# Patient Record
Sex: MALE | Race: WHITE | NOT HISPANIC OR LATINO
[De-identification: names, ages, dates, MRNs, and addresses within clinical notes are randomized per-mention and may not be internally consistent; named-entity substitution may affect disease eponyms.]

---

## 2018-08-07 ENCOUNTER — TRANSCRIPTION ENCOUNTER (OUTPATIENT)
Age: 68
End: 2018-08-07

## 2018-08-07 ENCOUNTER — APPOINTMENT (OUTPATIENT)
Dept: ORTHOPEDIC SURGERY | Facility: CLINIC | Age: 68
End: 2018-08-07
Payer: MEDICARE

## 2018-08-07 VITALS
HEIGHT: 70 IN | DIASTOLIC BLOOD PRESSURE: 73 MMHG | HEART RATE: 61 BPM | BODY MASS INDEX: 28.77 KG/M2 | WEIGHT: 201 LBS | SYSTOLIC BLOOD PRESSURE: 126 MMHG

## 2018-08-07 DIAGNOSIS — Z86.39 PERSONAL HISTORY OF OTHER ENDOCRINE, NUTRITIONAL AND METABOLIC DISEASE: ICD-10-CM

## 2018-08-07 DIAGNOSIS — Z78.9 OTHER SPECIFIED HEALTH STATUS: ICD-10-CM

## 2018-08-07 DIAGNOSIS — Z82.61 FAMILY HISTORY OF ARTHRITIS: ICD-10-CM

## 2018-08-07 DIAGNOSIS — Z87.891 PERSONAL HISTORY OF NICOTINE DEPENDENCE: ICD-10-CM

## 2018-08-07 DIAGNOSIS — Z86.79 PERSONAL HISTORY OF OTHER DISEASES OF THE CIRCULATORY SYSTEM: ICD-10-CM

## 2018-08-07 DIAGNOSIS — M17.0 BILATERAL PRIMARY OSTEOARTHRITIS OF KNEE: ICD-10-CM

## 2018-08-07 PROCEDURE — 73562 X-RAY EXAM OF KNEE 3: CPT | Mod: 50

## 2018-08-07 PROCEDURE — 72170 X-RAY EXAM OF PELVIS: CPT | Mod: 59

## 2018-08-07 PROCEDURE — 99204 OFFICE O/P NEW MOD 45 MIN: CPT

## 2019-07-23 ENCOUNTER — TRANSCRIPTION ENCOUNTER (OUTPATIENT)
Age: 69
End: 2019-07-23

## 2019-08-06 ENCOUNTER — NON-APPOINTMENT (OUTPATIENT)
Age: 69
End: 2019-08-06

## 2019-08-06 ENCOUNTER — APPOINTMENT (OUTPATIENT)
Dept: ELECTROPHYSIOLOGY | Facility: CLINIC | Age: 69
End: 2019-08-06
Payer: MEDICARE

## 2019-08-06 VITALS
OXYGEN SATURATION: 97 % | SYSTOLIC BLOOD PRESSURE: 148 MMHG | DIASTOLIC BLOOD PRESSURE: 83 MMHG | HEIGHT: 70 IN | BODY MASS INDEX: 30.06 KG/M2 | HEART RATE: 63 BPM | WEIGHT: 210 LBS

## 2019-08-06 DIAGNOSIS — R00.2 PALPITATIONS: ICD-10-CM

## 2019-08-06 DIAGNOSIS — Z51.81 ENCOUNTER FOR THERAPEUTIC DRUG LVL MONITORING: ICD-10-CM

## 2019-08-06 DIAGNOSIS — E11.9 TYPE 2 DIABETES MELLITUS W/OUT COMPLICATIONS: ICD-10-CM

## 2019-08-06 DIAGNOSIS — I25.2 OLD MYOCARDIAL INFARCTION: ICD-10-CM

## 2019-08-06 DIAGNOSIS — Z79.899 ENCOUNTER FOR THERAPEUTIC DRUG LVL MONITORING: ICD-10-CM

## 2019-08-06 PROCEDURE — 99204 OFFICE O/P NEW MOD 45 MIN: CPT

## 2019-08-06 PROCEDURE — 93000 ELECTROCARDIOGRAM COMPLETE: CPT

## 2019-08-06 RX ORDER — INSULIN GLARGINE 100 [IU]/ML
100 INJECTION, SOLUTION SUBCUTANEOUS
Qty: 45 | Refills: 0 | Status: DISCONTINUED | COMMUNITY
Start: 2018-06-19

## 2019-08-06 RX ORDER — HYDROCODONE/ACETAMINOPHEN 5 MG-500MG
CAPSULE ORAL
Refills: 0 | Status: DISCONTINUED | COMMUNITY
End: 2019-08-06

## 2019-08-06 RX ORDER — ATORVASTATIN CALCIUM 40 MG/1
40 TABLET, FILM COATED ORAL
Qty: 14 | Refills: 0 | Status: ACTIVE | COMMUNITY
Start: 2019-05-14

## 2019-08-06 RX ORDER — DULAGLUTIDE 4.5 MG/.5ML
INJECTION, SOLUTION SUBCUTANEOUS
Refills: 0 | Status: DISCONTINUED | COMMUNITY
End: 2019-08-06

## 2019-08-06 RX ORDER — METOPROLOL SUCCINATE 50 MG/1
50 TABLET, EXTENDED RELEASE ORAL
Qty: 90 | Refills: 0 | Status: DISCONTINUED | COMMUNITY
Start: 2019-07-19

## 2019-08-06 RX ORDER — OLMESARTAN MEDOXOMIL 20 MG/1
20 TABLET, FILM COATED ORAL
Qty: 30 | Refills: 0 | Status: DISCONTINUED | COMMUNITY
Start: 2019-02-19

## 2019-08-06 RX ORDER — OMEPRAZOLE 40 MG/1
40 CAPSULE, DELAYED RELEASE ORAL
Qty: 90 | Refills: 0 | Status: ACTIVE | COMMUNITY
Start: 2019-07-31

## 2019-08-06 RX ORDER — BLOOD SUGAR DIAGNOSTIC
STRIP MISCELLANEOUS
Qty: 300 | Refills: 0 | Status: DISCONTINUED | COMMUNITY
Start: 2019-02-20

## 2019-08-06 RX ORDER — INSULIN LISPRO 100 [IU]/ML
100 INJECTION, SOLUTION INTRAVENOUS; SUBCUTANEOUS
Refills: 0 | Status: ACTIVE | COMMUNITY

## 2019-08-06 RX ORDER — SOTALOL HYDROCHLORIDE 80 MG/1
80 TABLET ORAL TWICE DAILY
Refills: 0 | Status: DISCONTINUED | COMMUNITY
End: 2019-08-06

## 2019-08-06 RX ORDER — FENOFIBRATE 150 MG/1
150 CAPSULE ORAL DAILY
Refills: 0 | Status: ACTIVE | COMMUNITY

## 2019-08-06 RX ORDER — ASPIRIN 81 MG/1
81 TABLET ORAL DAILY
Refills: 0 | Status: ACTIVE | COMMUNITY

## 2019-08-06 RX ORDER — OXYCODONE AND ACETAMINOPHEN 7.5; 325 MG/1; MG/1
7.5-325 TABLET ORAL
Qty: 60 | Refills: 0 | Status: DISCONTINUED | COMMUNITY
Start: 2019-05-07

## 2019-08-06 RX ORDER — LANCETS
EACH MISCELLANEOUS
Qty: 300 | Refills: 0 | Status: DISCONTINUED | COMMUNITY
Start: 2019-06-17

## 2019-08-06 RX ORDER — OLMESARTAN MEDOXOMIL 40 MG/1
TABLET, FILM COATED ORAL
Refills: 0 | Status: DISCONTINUED | COMMUNITY
End: 2019-08-06

## 2019-08-06 RX ORDER — INSULIN LISPRO 100 [IU]/ML
100 INJECTION, SOLUTION INTRAVENOUS; SUBCUTANEOUS
Qty: 60 | Refills: 0 | Status: DISCONTINUED | COMMUNITY
Start: 2018-05-02

## 2019-08-06 RX ORDER — MOMETASONE FUROATE 1 MG/G
0.1 CREAM TOPICAL
Qty: 45 | Refills: 0 | Status: DISCONTINUED | COMMUNITY
Start: 2019-06-11

## 2019-08-06 RX ORDER — METFORMIN HYDROCHLORIDE 1000 MG/1
1000 TABLET, COATED ORAL TWICE DAILY
Qty: 180 | Refills: 2 | Status: ACTIVE | COMMUNITY

## 2019-08-06 RX ORDER — REPAGLINIDE 1 MG/1
TABLET ORAL
Refills: 0 | Status: DISCONTINUED | COMMUNITY
End: 2019-08-06

## 2019-08-06 RX ORDER — INSULIN GLARGINE 100 [IU]/ML
100 INJECTION, SOLUTION SUBCUTANEOUS
Refills: 0 | Status: ACTIVE | COMMUNITY

## 2019-08-06 RX ORDER — ALPRAZOLAM 0.5 MG/1
0.5 TABLET ORAL
Qty: 30 | Refills: 0 | Status: DISCONTINUED | COMMUNITY
Start: 2019-05-07

## 2019-08-06 RX ORDER — ZOLPIDEM TARTRATE 10 MG/1
10 TABLET ORAL
Qty: 30 | Refills: 0 | Status: DISCONTINUED | COMMUNITY
Start: 2019-07-01

## 2019-08-06 NOTE — HISTORY OF PRESENT ILLNESS
[FreeTextEntry1] : Dr. Moctezuma\par \par Fide Carrasco is a 70y/o man with Hx of HTN, HLD, DMII, CAD s/p PCI and CABG (2001), and infarct related cardiomyopathy/chronic systolic CHF who presents today for initial evaluation. Has yearly nuclear stress tests. 1/2018, LVEF 45%. Most recent stress test revealed worsening LVEF, 35%. Was seen by [facility] who recommended EPS with possible ICD placement. Admits doing well with no issues or complaints. Does experience palpitations when nervous. Has been on Sotalol, unsure of exact reason. Denies chest pain, SOB, syncope or near syncope.\par

## 2019-08-06 NOTE — DISCUSSION/SUMMARY
[FreeTextEntry1] : In summary, Fide Carrasco is a 68y/o man with Hx of HTN, HLD, DMII, CAD s/p PCI and CABG (2001), and infarct related cardiomyopathy/chronic systolic CHF who presents today for initial evaluation. Has yearly nuclear stress tests. 1/2018, LVEF 45%. Most recent stress test revealed worsening LVEF, 35%. Was seen by [facility] who recommended EPS with possible ICD placement. Admits doing well with no issues or complaints. Does experience palpitations when nervous. Has been on Sotalol, unsure of exact reason. Denies chest pain, SOB, syncope or near syncope. EKG today NSR. On review of medications, room for improved medication management. BP elevated in office. Consider increase in ARB therapy vs possible Entresto. Also currently on Sotalol 40mg BID. I recommended that he change sotalol to carvedilol to help optimize medication management. With next visit we will increase losartan or change to Entresto.  Should also have formal ECHO performed to confirm LVEF. \par \par Sincerely,\par \par Jesus Gutierrez MD

## 2019-08-06 NOTE — PHYSICAL EXAM
[General Appearance - Well Developed] : well developed [Normal Appearance] : normal appearance [Well Groomed] : well groomed [General Appearance - Well Nourished] : well nourished [No Deformities] : no deformities [General Appearance - In No Acute Distress] : no acute distress [Normal Conjunctiva] : the conjunctiva exhibited no abnormalities [Eyelids - No Xanthelasma] : the eyelids demonstrated no xanthelasmas [Normal Oral Mucosa] : normal oral mucosa [No Oral Pallor] : no oral pallor [No Oral Cyanosis] : no oral cyanosis [Normal Jugular Venous A Waves Present] : normal jugular venous A waves present [Normal Jugular Venous V Waves Present] : normal jugular venous V waves present [No Jugular Venous Chamberlain A Waves] : no jugular venous chamberlain A waves [Heart Sounds] : normal S1 and S2 [Heart Rate And Rhythm] : heart rate and rhythm were normal [Murmurs] : no murmurs present [Respiration, Rhythm And Depth] : normal respiratory rhythm and effort [Auscultation Breath Sounds / Voice Sounds] : lungs were clear to auscultation bilaterally [Exaggerated Use Of Accessory Muscles For Inspiration] : no accessory muscle use [Abdomen Soft] : soft [Abdomen Tenderness] : non-tender [Abdomen Mass (___ Cm)] : no abdominal mass palpated [Abnormal Walk] : normal gait [Gait - Sufficient For Exercise Testing] : the gait was sufficient for exercise testing [Nail Clubbing] : no clubbing of the fingernails [Cyanosis, Localized] : no localized cyanosis [Petechial Hemorrhages (___cm)] : no petechial hemorrhages [Oriented To Time, Place, And Person] : oriented to person, place, and time [Affect] : the affect was normal [Mood] : the mood was normal [No Anxiety] : not feeling anxious [Skin Color & Pigmentation] : normal skin color and pigmentation [] : no rash [No Venous Stasis] : no venous stasis [Skin Lesions] : no skin lesions [No Skin Ulcers] : no skin ulcer [No Xanthoma] : no  xanthoma was observed

## 2019-08-13 ENCOUNTER — APPOINTMENT (OUTPATIENT)
Dept: ELECTROPHYSIOLOGY | Facility: CLINIC | Age: 69
End: 2019-08-13
Payer: MEDICARE

## 2019-08-13 VITALS — DIASTOLIC BLOOD PRESSURE: 83 MMHG | SYSTOLIC BLOOD PRESSURE: 163 MMHG

## 2019-08-13 VITALS
SYSTOLIC BLOOD PRESSURE: 167 MMHG | DIASTOLIC BLOOD PRESSURE: 81 MMHG | HEART RATE: 65 BPM | OXYGEN SATURATION: 98 % | HEIGHT: 70 IN | WEIGHT: 210 LBS | BODY MASS INDEX: 30.06 KG/M2

## 2019-08-13 PROCEDURE — 99214 OFFICE O/P EST MOD 30 MIN: CPT

## 2019-08-13 NOTE — DISCUSSION/SUMMARY
[FreeTextEntry1] : In summary, Fide Carrasco is a 70y/o man with Hx of HTN, HLD, DMII, CAD s/p PCI and CABG (2001), and infarct related cardiomyopathy/chronic systolic CHF, LVEF 35-45%, who presents today for routine f/u. On last visit, his medications were adjusted and Sotalol was discontinued and he was initiated on carvedilol. Since discontinuing Sotalol, felt a few palpitations which were brief and non bothersome. Does not possible fatigue since starting carvedilol. Does not monitor his BP at home currently. Denies chest pain, SOB, syncope or near syncope. No EKG performed today. BP elevated although he believes his BP is commonly elevated in physician offices and did not take his medication yet today. Encouraged him to invest in a home BP monitor and keep a log of BP readings. Will continue to titrate OMT, increase/reinitiate losartan as previously prescribed for improved HTN management. During this visit losartan was increased to 100 mg per day.  He will follow-up in September.  The carvedilol is making him tired. He did not take his carvedilol this AM because he had not eaten. \par \par Sincerely,\par \par Jesus Gutierrez MD

## 2019-08-13 NOTE — HISTORY OF PRESENT ILLNESS
[FreeTextEntry1] : Dr. Moctezuma\par \par Fide Carrasco is a 68y/o man with Hx of HTN, HLD, DMII, CAD s/p PCI and CABG (2001), and infarct related cardiomyopathy/chronic systolic CHF, LVEF 35-45%, who presents today for routine f/u. On last visit, his medications were adjusted and Sotalol was discontinued and he was initiated on carvedilol. Since discontinuing Sotalol, felt a few palpitations which were brief and non bothersome. Does not possible fatigue since starting carvedilol. Does not monitor his BP at home currently. Denies chest pain, SOB, syncope or near syncope.\par \par \par

## 2019-08-13 NOTE — PHYSICAL EXAM
[General Appearance - Well Developed] : well developed [Normal Appearance] : normal appearance [Well Groomed] : well groomed [General Appearance - Well Nourished] : well nourished [No Deformities] : no deformities [General Appearance - In No Acute Distress] : no acute distress [Normal Conjunctiva] : the conjunctiva exhibited no abnormalities [Eyelids - No Xanthelasma] : the eyelids demonstrated no xanthelasmas [Normal Oral Mucosa] : normal oral mucosa [No Oral Pallor] : no oral pallor [No Oral Cyanosis] : no oral cyanosis [Normal Jugular Venous A Waves Present] : normal jugular venous A waves present [Normal Jugular Venous V Waves Present] : normal jugular venous V waves present [No Jugular Venous Chamberlain A Waves] : no jugular venous chamberlain A waves [Respiration, Rhythm And Depth] : normal respiratory rhythm and effort [Auscultation Breath Sounds / Voice Sounds] : lungs were clear to auscultation bilaterally [Exaggerated Use Of Accessory Muscles For Inspiration] : no accessory muscle use [Heart Rate And Rhythm] : heart rate and rhythm were normal [Heart Sounds] : normal S1 and S2 [Murmurs] : no murmurs present [Abdomen Soft] : soft [Abdomen Tenderness] : non-tender [Abdomen Mass (___ Cm)] : no abdominal mass palpated [Gait - Sufficient For Exercise Testing] : the gait was sufficient for exercise testing [Abnormal Walk] : normal gait [Cyanosis, Localized] : no localized cyanosis [Nail Clubbing] : no clubbing of the fingernails [Petechial Hemorrhages (___cm)] : no petechial hemorrhages [Skin Color & Pigmentation] : normal skin color and pigmentation [] : no rash [No Venous Stasis] : no venous stasis [Skin Lesions] : no skin lesions [No Skin Ulcers] : no skin ulcer [No Xanthoma] : no  xanthoma was observed [Oriented To Time, Place, And Person] : oriented to person, place, and time [Affect] : the affect was normal [Mood] : the mood was normal [No Anxiety] : not feeling anxious

## 2019-08-28 ENCOUNTER — RX CHANGE (OUTPATIENT)
Age: 69
End: 2019-08-28

## 2019-08-28 RX ORDER — CARVEDILOL 6.25 MG/1
6.25 TABLET, FILM COATED ORAL
Qty: 60 | Refills: 1 | Status: DISCONTINUED | COMMUNITY
Start: 2019-08-06 | End: 2019-08-28

## 2019-09-27 ENCOUNTER — RX CHANGE (OUTPATIENT)
Age: 69
End: 2019-09-27

## 2019-09-27 ENCOUNTER — NON-APPOINTMENT (OUTPATIENT)
Age: 69
End: 2019-09-27

## 2019-09-27 ENCOUNTER — OTHER (OUTPATIENT)
Age: 69
End: 2019-09-27

## 2019-09-27 ENCOUNTER — APPOINTMENT (OUTPATIENT)
Dept: ELECTROPHYSIOLOGY | Facility: CLINIC | Age: 69
End: 2019-09-27
Payer: MEDICARE

## 2019-09-27 VITALS
WEIGHT: 210 LBS | DIASTOLIC BLOOD PRESSURE: 72 MMHG | OXYGEN SATURATION: 98 % | RESPIRATION RATE: 14 BRPM | HEART RATE: 57 BPM | BODY MASS INDEX: 30.06 KG/M2 | HEIGHT: 70 IN | SYSTOLIC BLOOD PRESSURE: 153 MMHG

## 2019-09-27 PROCEDURE — 93000 ELECTROCARDIOGRAM COMPLETE: CPT

## 2019-09-27 PROCEDURE — 99214 OFFICE O/P EST MOD 30 MIN: CPT

## 2019-09-27 NOTE — PHYSICAL EXAM
[General Appearance - Well Developed] : well developed [Well Groomed] : well groomed [Normal Appearance] : normal appearance [General Appearance - In No Acute Distress] : no acute distress [General Appearance - Well Nourished] : well nourished [No Deformities] : no deformities [Normal Conjunctiva] : the conjunctiva exhibited no abnormalities [Normal Oral Mucosa] : normal oral mucosa [No Jugular Venous Chamberlain A Waves] : no jugular venous chamberlain A waves [Respiration, Rhythm And Depth] : normal respiratory rhythm and effort [Auscultation Breath Sounds / Voice Sounds] : lungs were clear to auscultation bilaterally [Exaggerated Use Of Accessory Muscles For Inspiration] : no accessory muscle use [Heart Rate And Rhythm] : heart rate and rhythm were normal [Heart Sounds] : normal S1 and S2 [Murmurs] : no murmurs present [Abdomen Soft] : soft [Abdomen Mass (___ Cm)] : no abdominal mass palpated [Abdomen Tenderness] : non-tender [Abnormal Walk] : normal gait [Cyanosis, Localized] : no localized cyanosis [Skin Color & Pigmentation] : normal skin color and pigmentation [] : no rash [Skin Turgor] : normal skin turgor [Oriented To Time, Place, And Person] : oriented to person, place, and time [Mood] : the mood was normal [Affect] : the affect was normal [No Anxiety] : not feeling anxious

## 2019-09-27 NOTE — DISCUSSION/SUMMARY
[FreeTextEntry1] : Fide Carrasco is a 68y/o man with Hx of HTN, HLD, DMII, CAD s/p PCI and CABG (2001), and infarct related cardiomyopathy/chronic systolic CHF, LVEF 35-45%, who presents today for routine f/u.  He remains off Sotalol, compliant on Carvedilol.  At last visit, Losartan was reinitiated and dose increased to 100mg for better HTN management.  Patient states he feels well without any complaints today.  Noted to be hypertensive despite increase in ARB from last visit.  He denies headaches, chest pain, palpitations, sob, orthopnea or peripheral edema.  Patient is euvolemic on exam and states compliance on medications. To optimize medical therapy I recommended increasing the losartan 150 mg and the carvedilol to 9.75 mg PO BID. He will return in one month. \par \par Sincerely,\par \par Jesus Gutierrez MD

## 2019-09-27 NOTE — HISTORY OF PRESENT ILLNESS
[FreeTextEntry1] : Dr. Moctezuma\par \par Fide Carrasco is a 68y/o man with Hx of HTN, HLD, DMII, CAD s/p PCI and CABG (2001), and infarct related cardiomyopathy/chronic systolic CHF, LVEF 35-45%, who presents today for routine f/u.  He remains off Sotalol, compliant on Carvedilol.  At last visit, Losartan was reinitiated and dose increased to 100mg for better HTN management.  Patient states he feels well without any complaints today.  Noted to be hypertensive despite increase in ARB from last visit.  States compliance with medications.  He denies headaches, chest pain, palpitations, sob, orthopnea or peripheral edema.\par \par

## 2019-11-12 ENCOUNTER — NON-APPOINTMENT (OUTPATIENT)
Age: 69
End: 2019-11-12

## 2019-11-12 ENCOUNTER — APPOINTMENT (OUTPATIENT)
Dept: ELECTROPHYSIOLOGY | Facility: CLINIC | Age: 69
End: 2019-11-12
Payer: MEDICARE

## 2019-11-12 VITALS
BODY MASS INDEX: 29.63 KG/M2 | WEIGHT: 207 LBS | HEART RATE: 65 BPM | DIASTOLIC BLOOD PRESSURE: 61 MMHG | OXYGEN SATURATION: 99 % | HEIGHT: 70 IN | SYSTOLIC BLOOD PRESSURE: 129 MMHG

## 2019-11-12 PROCEDURE — 93000 ELECTROCARDIOGRAM COMPLETE: CPT

## 2019-11-12 PROCEDURE — 99213 OFFICE O/P EST LOW 20 MIN: CPT

## 2019-11-12 RX ORDER — PEN NEEDLE, DIABETIC 29 G X1/2"
32G X 4 MM NEEDLE, DISPOSABLE MISCELLANEOUS
Qty: 400 | Refills: 0 | Status: ACTIVE | COMMUNITY
Start: 2019-08-16

## 2019-11-12 NOTE — PHYSICAL EXAM
[General Appearance - Well Developed] : well developed [Normal Appearance] : normal appearance [Well Groomed] : well groomed [General Appearance - Well Nourished] : well nourished [No Deformities] : no deformities [General Appearance - In No Acute Distress] : no acute distress [Normal Conjunctiva] : the conjunctiva exhibited no abnormalities [Eyelids - No Xanthelasma] : the eyelids demonstrated no xanthelasmas [Normal Oral Mucosa] : normal oral mucosa [No Oral Pallor] : no oral pallor [No Oral Cyanosis] : no oral cyanosis [Normal Jugular Venous A Waves Present] : normal jugular venous A waves present [Normal Jugular Venous V Waves Present] : normal jugular venous V waves present [No Jugular Venous Chamberlain A Waves] : no jugular venous chamberlain A waves [Heart Rate And Rhythm] : heart rate and rhythm were normal [Murmurs] : no murmurs present [Heart Sounds] : normal S1 and S2 [Abdomen Soft] : soft [Abdomen Tenderness] : non-tender [Abdomen Mass (___ Cm)] : no abdominal mass palpated [Abnormal Walk] : normal gait [Gait - Sufficient For Exercise Testing] : the gait was sufficient for exercise testing [Nail Clubbing] : no clubbing of the fingernails [Cyanosis, Localized] : no localized cyanosis [Petechial Hemorrhages (___cm)] : no petechial hemorrhages [Skin Color & Pigmentation] : normal skin color and pigmentation [No Venous Stasis] : no venous stasis [Skin Lesions] : no skin lesions [No Skin Ulcers] : no skin ulcer [No Xanthoma] : no  xanthoma was observed [Oriented To Time, Place, And Person] : oriented to person, place, and time [Affect] : the affect was normal [No Anxiety] : not feeling anxious [Mood] : the mood was normal [] : no respiratory distress [Respiration, Rhythm And Depth] : normal respiratory rhythm and effort [Exaggerated Use Of Accessory Muscles For Inspiration] : no accessory muscle use [Auscultation Breath Sounds / Voice Sounds] : lungs were clear to auscultation bilaterally

## 2019-11-12 NOTE — PHYSICAL EXAM
[General Appearance - Well Developed] : well developed [Normal Appearance] : normal appearance [Well Groomed] : well groomed [No Deformities] : no deformities [General Appearance - Well Nourished] : well nourished [General Appearance - In No Acute Distress] : no acute distress [Normal Conjunctiva] : the conjunctiva exhibited no abnormalities [Eyelids - No Xanthelasma] : the eyelids demonstrated no xanthelasmas [Normal Oral Mucosa] : normal oral mucosa [No Oral Pallor] : no oral pallor [No Oral Cyanosis] : no oral cyanosis [Normal Jugular Venous A Waves Present] : normal jugular venous A waves present [Normal Jugular Venous V Waves Present] : normal jugular venous V waves present [No Jugular Venous Chamberlain A Waves] : no jugular venous chamberlain A waves [Heart Rate And Rhythm] : heart rate and rhythm were normal [Heart Sounds] : normal S1 and S2 [Murmurs] : no murmurs present [Abdomen Soft] : soft [Abdomen Tenderness] : non-tender [Abdomen Mass (___ Cm)] : no abdominal mass palpated [Abnormal Walk] : normal gait [Gait - Sufficient For Exercise Testing] : the gait was sufficient for exercise testing [Nail Clubbing] : no clubbing of the fingernails [Cyanosis, Localized] : no localized cyanosis [Petechial Hemorrhages (___cm)] : no petechial hemorrhages [Skin Color & Pigmentation] : normal skin color and pigmentation [No Venous Stasis] : no venous stasis [Skin Lesions] : no skin lesions [No Skin Ulcers] : no skin ulcer [No Xanthoma] : no  xanthoma was observed [Oriented To Time, Place, And Person] : oriented to person, place, and time [Affect] : the affect was normal [No Anxiety] : not feeling anxious [Mood] : the mood was normal [] : no respiratory distress [Respiration, Rhythm And Depth] : normal respiratory rhythm and effort [Exaggerated Use Of Accessory Muscles For Inspiration] : no accessory muscle use [Auscultation Breath Sounds / Voice Sounds] : lungs were clear to auscultation bilaterally

## 2019-11-14 NOTE — DISCUSSION/SUMMARY
[FreeTextEntry1] : In summary Fide Carrasco is a 70y/o man with Hx of HTN, HLD, DMII, CAD s/p PCI and CABG (2001), and infarct related cardiomyopathy/chronic systolic CHF, LVEF 35-45%, who presents today for routine f/u. He remains off Sotalol, compliant on Carvedilol. At last visit, Losartan was reinitiated and dose increased to 150mg for better HTN management. Patient states he feels well without any complaints today. Noted to be less hypertensive with increase in ARB from last visit. States compliance with medications. He denies headaches, chest pain, palpitations, sob, orthopnea or peripheral edema. He does note fatigue and nocturia. I recommended that he continue to take optimal medical therapy with the goal of improved EF. Patient will undergo an echocardiogram in 3 months to check his LV function. \par \par Sincerely,\par \par Jesus Gutierrez MD

## 2019-11-14 NOTE — HISTORY OF PRESENT ILLNESS
[FreeTextEntry1] : Dr. Moctezuma\par \par Fide Carrasco is a 70y/o man with Hx of HTN, HLD, DMII, CAD s/p PCI and CABG (2001), and infarct related cardiomyopathy/chronic systolic CHF, LVEF 35-45%, who presents today for routine f/u. He remains off Sotalol, compliant on Carvedilol. At last visit, Losartan was reinitiated and dose increased to 150mg for better HTN management. Patient states he feels well without any complaints today. Noted to be less hypertensive with increase in ARB from last visit. States compliance with medications. He denies headaches, chest pain, palpitations, sob, orthopnea or peripheral edema. He does note fatigue and nocturia.

## 2019-11-14 NOTE — HISTORY OF PRESENT ILLNESS
[FreeTextEntry1] : Dr. Moctezuma\par \par Fide Carrasco is a 68y/o man with Hx of HTN, HLD, DMII, CAD s/p PCI and CABG (2001), and infarct related cardiomyopathy/chronic systolic CHF, LVEF 35-45%, who presents today for routine f/u. He remains off Sotalol, compliant on Carvedilol. At last visit, Losartan was reinitiated and dose increased to 150mg for better HTN management. Patient states he feels well without any complaints today. Noted to be less hypertensive with increase in ARB from last visit. States compliance with medications. He denies headaches, chest pain, palpitations, sob, orthopnea or peripheral edema. He does note fatigue and nocturia.

## 2019-12-31 ENCOUNTER — APPOINTMENT (OUTPATIENT)
Dept: ELECTROPHYSIOLOGY | Facility: CLINIC | Age: 69
End: 2019-12-31

## 2020-02-03 ENCOUNTER — RX RENEWAL (OUTPATIENT)
Age: 70
End: 2020-02-03

## 2020-02-11 ENCOUNTER — RX RENEWAL (OUTPATIENT)
Age: 70
End: 2020-02-11

## 2020-03-17 ENCOUNTER — APPOINTMENT (OUTPATIENT)
Dept: CV DIAGNOSITCS | Facility: HOSPITAL | Age: 70
End: 2020-03-17

## 2020-03-17 ENCOUNTER — APPOINTMENT (OUTPATIENT)
Dept: ELECTROPHYSIOLOGY | Facility: CLINIC | Age: 70
End: 2020-03-17

## 2020-07-09 ENCOUNTER — RX RENEWAL (OUTPATIENT)
Age: 70
End: 2020-07-09

## 2020-07-09 RX ORDER — CLOPIDOGREL BISULFATE 75 MG/1
75 TABLET, FILM COATED ORAL
Qty: 90 | Refills: 3 | Status: ACTIVE | COMMUNITY
Start: 2020-07-09 | End: 1900-01-01

## 2020-07-14 ENCOUNTER — RX CHANGE (OUTPATIENT)
Age: 70
End: 2020-07-14

## 2020-07-14 RX ORDER — LOSARTAN POTASSIUM 100 MG/1
100 TABLET, FILM COATED ORAL
Qty: 90 | Refills: 3 | Status: DISCONTINUED | COMMUNITY
Start: 2020-07-14 | End: 2020-07-14

## 2020-08-06 ENCOUNTER — RX RENEWAL (OUTPATIENT)
Age: 70
End: 2020-08-06

## 2020-09-15 ENCOUNTER — RX RENEWAL (OUTPATIENT)
Age: 70
End: 2020-09-15

## 2020-09-17 RX ORDER — LOSARTAN POTASSIUM 50 MG/1
50 TABLET, FILM COATED ORAL
Qty: 90 | Refills: 0 | Status: DISCONTINUED | COMMUNITY
Start: 2019-07-17 | End: 2020-09-17

## 2020-09-17 RX ORDER — LOSARTAN POTASSIUM 50 MG/1
50 TABLET, FILM COATED ORAL
Qty: 180 | Refills: 3 | Status: DISCONTINUED | COMMUNITY
Start: 2020-07-14 | End: 2020-09-17

## 2020-10-06 ENCOUNTER — APPOINTMENT (OUTPATIENT)
Dept: ELECTROPHYSIOLOGY | Facility: CLINIC | Age: 70
End: 2020-10-06

## 2020-10-14 ENCOUNTER — RX RENEWAL (OUTPATIENT)
Age: 70
End: 2020-10-14

## 2020-11-03 ENCOUNTER — APPOINTMENT (OUTPATIENT)
Dept: ELECTROPHYSIOLOGY | Facility: CLINIC | Age: 70
End: 2020-11-03

## 2020-11-10 ENCOUNTER — APPOINTMENT (OUTPATIENT)
Dept: ELECTROPHYSIOLOGY | Facility: CLINIC | Age: 70
End: 2020-11-10
Payer: MEDICARE

## 2020-11-10 VITALS
OXYGEN SATURATION: 99 % | DIASTOLIC BLOOD PRESSURE: 82 MMHG | HEIGHT: 70 IN | SYSTOLIC BLOOD PRESSURE: 159 MMHG | HEART RATE: 61 BPM | TEMPERATURE: 94.3 F | BODY MASS INDEX: 28.63 KG/M2 | WEIGHT: 200 LBS | RESPIRATION RATE: 14 BRPM

## 2020-11-10 VITALS — HEART RATE: 65 BPM | DIASTOLIC BLOOD PRESSURE: 73 MMHG | SYSTOLIC BLOOD PRESSURE: 154 MMHG

## 2020-11-10 DIAGNOSIS — E78.5 HYPERLIPIDEMIA, UNSPECIFIED: ICD-10-CM

## 2020-11-10 DIAGNOSIS — I50.22 CHRONIC SYSTOLIC (CONGESTIVE) HEART FAILURE: ICD-10-CM

## 2020-11-10 DIAGNOSIS — I25.5 ISCHEMIC CARDIOMYOPATHY: ICD-10-CM

## 2020-11-10 DIAGNOSIS — I10 ESSENTIAL (PRIMARY) HYPERTENSION: ICD-10-CM

## 2020-11-10 PROCEDURE — 93000 ELECTROCARDIOGRAM COMPLETE: CPT

## 2020-11-10 PROCEDURE — 99214 OFFICE O/P EST MOD 30 MIN: CPT

## 2020-11-10 RX ORDER — FENOFIBRATE 145 MG/1
145 TABLET, COATED ORAL
Qty: 90 | Refills: 0 | Status: DISCONTINUED | COMMUNITY
Start: 2019-07-22 | End: 2020-11-10

## 2020-11-10 NOTE — PHYSICAL EXAM
[General Appearance - Well Developed] : well developed [Normal Appearance] : normal appearance [Well Groomed] : well groomed [General Appearance - Well Nourished] : well nourished [No Deformities] : no deformities [General Appearance - In No Acute Distress] : no acute distress [Normal Conjunctiva] : the conjunctiva exhibited no abnormalities [Eyelids - No Xanthelasma] : the eyelids demonstrated no xanthelasmas [Normal Oral Mucosa] : normal oral mucosa [No Oral Pallor] : no oral pallor [No Oral Cyanosis] : no oral cyanosis [Normal Jugular Venous A Waves Present] : normal jugular venous A waves present [Normal Jugular Venous V Waves Present] : normal jugular venous V waves present [No Jugular Venous Chamberlain A Waves] : no jugular venous chamberlain A waves [Respiration, Rhythm And Depth] : normal respiratory rhythm and effort [Exaggerated Use Of Accessory Muscles For Inspiration] : no accessory muscle use [Auscultation Breath Sounds / Voice Sounds] : lungs were clear to auscultation bilaterally [Heart Rate And Rhythm] : heart rate and rhythm were normal [Heart Sounds] : normal S1 and S2 [Murmurs] : no murmurs present [Abdomen Soft] : soft [Abdomen Tenderness] : non-tender [Abdomen Mass (___ Cm)] : no abdominal mass palpated [Abnormal Walk] : normal gait [Gait - Sufficient For Exercise Testing] : the gait was sufficient for exercise testing [Nail Clubbing] : no clubbing of the fingernails [Cyanosis, Localized] : no localized cyanosis [Petechial Hemorrhages (___cm)] : no petechial hemorrhages [Skin Color & Pigmentation] : normal skin color and pigmentation [] : no rash [No Venous Stasis] : no venous stasis [Skin Lesions] : no skin lesions [No Skin Ulcers] : no skin ulcer [No Xanthoma] : no  xanthoma was observed [Oriented To Time, Place, And Person] : oriented to person, place, and time [Affect] : the affect was normal [Mood] : the mood was normal [No Anxiety] : not feeling anxious

## 2020-11-10 NOTE — HISTORY OF PRESENT ILLNESS
[FreeTextEntry1] : Dr. Moctezuma\par \par Fide Carrasco is a 69y/o man with Hx of HTN, HLD, DMII, CAD s/p MI (2014), PCI (2008) and CABG (2001), and infarct related cardiomyopathy/chronic systolic CHF, LVEF 35-45%, who presents today for routine f/u. Has been doing well. Was scheduled for ECHO during COVID pandemic which he cancelled. Denies chest pain, palpitations, SOB, syncope or near syncope. Has upcoming ECHO/stress test with Dr. Moctezuma. Has been compliant with medication.

## 2020-11-10 NOTE — DISCUSSION/SUMMARY
[FreeTextEntry1] : Fide Carrasco is a 69y/o man with Hx of HTN, HLD, DMII, CAD s/p MI (2014), PCI (2008) and CABG (2001), and infarct related cardiomyopathy/chronic systolic CHF, LVEF 35-45%, who presents today for routine f/u.\par \par Impression:\par \par 1. ICM/chronic systolic CHF: EKG today NSR. Last stress test with severe LV dysfunction (2019). Has been maintained on OMT including carvedilol and losartan. Scheduled for upcoming repeat ECHO and stress test. If LVEF remains <35%, consider need for dual chamber ICD (bradycardic on EKG) for primary prevention of SCD. Risks, benefits, and alternatives discussed. Prefers to avoid ICD placement. Consider also introducing Entresto for improved optimization of medication and BP control. \par \par 2. HTN: BP elevated in office, can consider possible Entresto (would discuss with Cardiologist). For now resume oral antihypertensives as prescribed. Encouraged heart healthy diet, sodium restriction, and weight loss. Continue regular f/u with Cardiologist for further HTN management.\par \par 3. HLD: resume statin therapy as prescribed and regular f/u with Cardiologist for routine lipid monitoring and management.\par \par Sincerely,\par \par Jesus Gutierrez MD

## 2020-11-23 ENCOUNTER — RX RENEWAL (OUTPATIENT)
Age: 70
End: 2020-11-23

## 2020-12-07 ENCOUNTER — NON-APPOINTMENT (OUTPATIENT)
Age: 70
End: 2020-12-07

## 2021-01-20 ENCOUNTER — RX RENEWAL (OUTPATIENT)
Age: 71
End: 2021-01-20

## 2021-04-19 ENCOUNTER — RX RENEWAL (OUTPATIENT)
Age: 71
End: 2021-04-19

## 2021-09-23 ENCOUNTER — RX RENEWAL (OUTPATIENT)
Age: 71
End: 2021-09-23

## 2021-12-29 ENCOUNTER — RX RENEWAL (OUTPATIENT)
Age: 71
End: 2021-12-29

## 2021-12-30 ENCOUNTER — NON-APPOINTMENT (OUTPATIENT)
Age: 71
End: 2021-12-30

## 2022-01-03 ENCOUNTER — APPOINTMENT (OUTPATIENT)
Dept: ORTHOPEDIC SURGERY | Facility: CLINIC | Age: 72
End: 2022-01-03
Payer: MEDICARE

## 2022-01-03 ENCOUNTER — NON-APPOINTMENT (OUTPATIENT)
Age: 72
End: 2022-01-03

## 2022-01-03 VITALS
WEIGHT: 200 LBS | DIASTOLIC BLOOD PRESSURE: 79 MMHG | HEIGHT: 70 IN | BODY MASS INDEX: 28.63 KG/M2 | SYSTOLIC BLOOD PRESSURE: 166 MMHG | HEART RATE: 56 BPM

## 2022-01-03 PROCEDURE — 99204 OFFICE O/P NEW MOD 45 MIN: CPT

## 2022-01-03 RX ORDER — METHYLPREDNISOLONE 4 MG/1
4 TABLET ORAL
Qty: 1 | Refills: 1 | Status: ACTIVE | COMMUNITY
Start: 2022-01-03 | End: 1900-01-01

## 2022-02-14 ENCOUNTER — APPOINTMENT (OUTPATIENT)
Dept: ORTHOPEDIC SURGERY | Facility: CLINIC | Age: 72
End: 2022-02-14
Payer: MEDICARE

## 2022-02-14 VITALS
SYSTOLIC BLOOD PRESSURE: 145 MMHG | HEIGHT: 70 IN | BODY MASS INDEX: 2.86 KG/M2 | WEIGHT: 20 LBS | DIASTOLIC BLOOD PRESSURE: 70 MMHG | HEART RATE: 63 BPM

## 2022-02-14 PROCEDURE — 99214 OFFICE O/P EST MOD 30 MIN: CPT

## 2022-03-28 ENCOUNTER — APPOINTMENT (OUTPATIENT)
Dept: ORTHOPEDIC SURGERY | Facility: CLINIC | Age: 72
End: 2022-03-28
Payer: MEDICARE

## 2022-03-28 ENCOUNTER — RX RENEWAL (OUTPATIENT)
Age: 72
End: 2022-03-28

## 2022-03-28 VITALS — HEIGHT: 68 IN | WEIGHT: 200 LBS | BODY MASS INDEX: 30.31 KG/M2

## 2022-03-28 VITALS — DIASTOLIC BLOOD PRESSURE: 67 MMHG | SYSTOLIC BLOOD PRESSURE: 168 MMHG | HEART RATE: 60 BPM

## 2022-03-28 PROCEDURE — 99214 OFFICE O/P EST MOD 30 MIN: CPT

## 2022-06-29 ENCOUNTER — APPOINTMENT (OUTPATIENT)
Dept: ORTHOPEDIC SURGERY | Facility: CLINIC | Age: 72
End: 2022-06-29

## 2022-07-26 ENCOUNTER — RX RENEWAL (OUTPATIENT)
Age: 72
End: 2022-07-26

## 2022-07-26 RX ORDER — LOSARTAN POTASSIUM 100 MG/1
100 TABLET, FILM COATED ORAL DAILY
Qty: 90 | Refills: 3 | Status: ACTIVE | COMMUNITY
Start: 2019-04-09 | End: 1900-01-01

## 2022-08-31 ENCOUNTER — APPOINTMENT (OUTPATIENT)
Dept: ORTHOPEDIC SURGERY | Facility: CLINIC | Age: 72
End: 2022-08-31

## 2022-08-31 VITALS
WEIGHT: 200 LBS | HEART RATE: 61 BPM | SYSTOLIC BLOOD PRESSURE: 129 MMHG | DIASTOLIC BLOOD PRESSURE: 65 MMHG | HEIGHT: 68 IN | BODY MASS INDEX: 30.31 KG/M2

## 2022-08-31 PROCEDURE — 99214 OFFICE O/P EST MOD 30 MIN: CPT

## 2022-08-31 NOTE — HISTORY OF PRESENT ILLNESS
[Stable] : stable [de-identified] : 71 year old male presents for follow up of cervical stenosis. \par Pain radiates to B/L shoulders, L>R.\par Denies numbness/tingling.\par Denies weakness of arms and legs.  \par Sees Dr. Dennis.\par Not dropping any objects, no upper motor neuron findings.\par Laying down aggravates the pain. \par Not dropping objects.\par Okay with fine motor. \par + Knee pain not unsteady.\par Takes a narcotic (does not remember which); states that he helps he has relief at times. \par Has tried PT several months ago and felt mild relief. \par Denies epidurals.\par No arm symptoms.\par Has hx of Bypass surgery and has Hx of DM \par Surgery was discussed at last visit. \par Stable, no issues with fine motor function. \par No fever chills sweats nausea vomiting no bowel or bladder dysfunction, no recent weight loss or gain no night pain. This history is in addition to the intake form that I personally reviewed.

## 2022-08-31 NOTE — DISCUSSION/SUMMARY
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 \par Discussed all options. \par Discussed surgery.\par Exam stable. \par Posterior C3-7 decompression and fusion.\par Risks of surgery include infection, dural tear, nerve root injury, reherniation, future back pain, future leg pain, retained fragment, hematoma, urinary retention, worsening leg symptoms, footdrop, inability to place hardware, hardware breakage, nonunion, adjacent level breakdown requiring surgery, anesthetic risks, blood transfusion risks, positioning pain, visceral and vascular injury, deep vein thrombosis, pulmonary embolus, and death. Somatosensory evoked potentials and EMG monitoring will be used. Patient will need spinal orthosis pre and post operatively. All risks were explained not exclusive to the ones mentioned alternatives were discussed and all questions were answered the patient agrees and understands the above and is in complete agreement with the plan. \par Patient after shared decision making wants to wait on surgery since he has no extremity issues. \par All questions were answered, all alternatives discussed and the patient is in complete agreement with that plan. Follow-up appointment as instructed. Any issues and the patient will call or come in sooner. All options discussed including rest, medicine, home exercise, acupuncture, Chiropractic care, Physical Therapy, Pain management, and last resort surgery. \par No fine motor issues.\par Sees Dr. Garcias.\par Refusing surgery for now.\par F/U in 6 months, if worsens should and will consider surgery.\par Thank you for the consultation.

## 2022-08-31 NOTE — ADDENDUM
[FreeTextEntry1] : This note was written by Famliia Back on 08/31/2022 acting as scribe for Dr. Ihsan Collins M.D.\par \par I, Ihsan Collins MD, have read and attest that all the information, medical decision making and discharge instructions within are true and accurate.

## 2022-08-31 NOTE — PHYSICAL EXAM
[Normal] : Gait: normal [Hernandez's Sign] : negative Hernandez's sign [Pronator Drift] : negative pronator drift [SLR] : negative straight leg raise [de-identified] : 5 out of 5 motor strength, sensation is intact and symmetrical full range of motion flexion extension and rotation, no palpatory tenderness full range of motion of hips knees shoulders and elbows (all four extremities), no atrophy, negative straight leg raise, no pathological reflexes, no swelling, normal ambulation, no apparent distress skin is intact, no palpable lymph nodes, no upper or lower extremity instability, alert and oriented x3 and normal mood. Normal finger-to nose test. \par No upper motor neuron findings.  [de-identified] : I reviewed, interpreted and clinically correlated the following outside imaging studies, \par MRI Cervical 12/6/21 ([facility]) - Impression (report) - multilevel cervical degenerative disc disease. Significant spinal cord and nerve root compression at C3/4, C5/6 and C6/7. At each of these levels there is myelomalacia and/or edema. Significant compression of the right C5 nerve root. Multilevel degenerative changes present-reviewed with patient.

## 2022-10-17 ENCOUNTER — APPOINTMENT (OUTPATIENT)
Dept: ORTHOPEDIC SURGERY | Facility: CLINIC | Age: 72
End: 2022-10-17

## 2022-10-24 ENCOUNTER — APPOINTMENT (OUTPATIENT)
Dept: ORTHOPEDIC SURGERY | Facility: CLINIC | Age: 72
End: 2022-10-24

## 2022-10-24 DIAGNOSIS — M20.21 HALLUX RIGIDUS, RIGHT FOOT: ICD-10-CM

## 2022-10-24 DIAGNOSIS — Z00.00 ENCOUNTER FOR GENERAL ADULT MEDICAL EXAMINATION W/OUT ABNORMAL FINDINGS: ICD-10-CM

## 2022-10-24 PROCEDURE — 99214 OFFICE O/P EST MOD 30 MIN: CPT

## 2022-10-24 PROCEDURE — 73630 X-RAY EXAM OF FOOT: CPT | Mod: RT

## 2022-10-24 NOTE — PHYSICAL EXAM
[2nd] : 2nd [3rd] : 3rd [4th] : 4th [5th] : 5th [Mild] : mild swelling of MTP joint/great toe [1st] : 1st [NL (40)] : plantar flexion 40 degrees [NL (20)] : eversion 20 degrees [5___] : [facility] 5[unfilled]/5 [2+] : posterior tibialis pulse: 2+ [Normal] : saphenous nerve sensation normal [] : patient ambulates without assistive device [Right] : right foot [Weight -] : weightbearing [FreeTextEntry3] : Clean ulcerations medial hallux and lateral 5th toe.  [FreeTextEntry9] : 1st MTPJ PF 15 [de-identified] : 1st MTPJ degenerative changes.  [de-identified] : inversion 15 degrees [de-identified] : MTP joint DF 30 degrees

## 2022-10-24 NOTE — HISTORY OF PRESENT ILLNESS
[5] : 5 [Burning] : burning [Dull/Aching] : dull/aching [Localized] : localized [de-identified] : Pt is a 72 year old M who presents today for evaluation of their right foot. Denies trauma. Medial great toe pain for several months. WB in clogs today. He is under care of podiatry for nail and skin maintenance. History diabetes with most recent HgbA1C low 7's, history psoriasis. No formal treatment for medial great toe pain.   [] : Post Surgical Visit: no [FreeTextEntry1] : R foot

## 2022-10-24 NOTE — ASSESSMENT
[FreeTextEntry1] : Discussed treatment options with patient, both operative and non-operative. NSAIDS (topical vs. oral), shoe modifications, activity modification, orthotics with Fajardo's extension, steroid injection and cheilectomy discussed as options. Recommend stiff soled shoe. Ice to affected area is recommended.\par \par For skin and nail issues, recommend follow up with podiatry and/or wound care.

## 2022-10-24 NOTE — REASON FOR VISIT
Gastroesophageal reflux disease, esophagitis presence not specified [FreeTextEntry2] : New Patient: Right Foot

## 2022-11-04 ENCOUNTER — APPOINTMENT (OUTPATIENT)
Dept: WOUND CARE | Facility: CLINIC | Age: 72
End: 2022-11-04

## 2022-11-04 DIAGNOSIS — E11.9 TYPE 2 DIABETES MELLITUS W/OUT COMPLICATIONS: ICD-10-CM

## 2022-11-04 DIAGNOSIS — L40.3 PUSTULOSIS PALMARIS ET PLANTARIS: ICD-10-CM

## 2022-11-04 DIAGNOSIS — L85.3 XEROSIS CUTIS: ICD-10-CM

## 2022-11-04 PROCEDURE — 99202 OFFICE O/P NEW SF 15 MIN: CPT

## 2022-11-04 RX ORDER — TRIAMCINOLONE ACETONIDE 1 MG/G
0.1 CREAM TOPICAL DAILY
Qty: 1 | Refills: 0 | Status: ACTIVE | COMMUNITY
Start: 2022-11-04 | End: 1900-01-01

## 2022-11-04 NOTE — PLAN
[FreeTextEntry1] : fissured skin lesions medial right hallux and 5th metatarsal \par patient sees dermatologist who has been unable to heal problem\par patient was told to do do dressings under occlusion but has not utilized this technique\par rx triamcinolone acetonamide-twice a day recommend patient use dressing under occlusion and follow up with dermatologist for evaluation\par patient resistant to go back to dermatologist \par patient told he needs to get psoriasis treated for fissures

## 2022-11-04 NOTE — HISTORY OF PRESENT ILLNESS
[FreeTextEntry1] : 72 year old male presents to wound center with dried cracked skin and psoriasis\par DP and PT palpable\par severely xerotic skin\par cracked skin lateral 5th metatarsal head and medial right hallux\par patient has dried skin that does not respond to vaseline\par

## 2022-11-07 ENCOUNTER — RX RENEWAL (OUTPATIENT)
Age: 72
End: 2022-11-07

## 2022-11-07 RX ORDER — CARVEDILOL 6.25 MG/1
6.25 TABLET, FILM COATED ORAL
Qty: 270 | Refills: 3 | Status: ACTIVE | COMMUNITY
Start: 2019-08-28 | End: 1900-01-01

## 2023-02-13 ENCOUNTER — APPOINTMENT (OUTPATIENT)
Dept: ORTHOPEDIC SURGERY | Facility: CLINIC | Age: 73
End: 2023-02-13
Payer: MEDICARE

## 2023-02-13 PROCEDURE — 99214 OFFICE O/P EST MOD 30 MIN: CPT

## 2023-08-24 ENCOUNTER — RX RENEWAL (OUTPATIENT)
Age: 73
End: 2023-08-24

## 2023-08-24 RX ORDER — LOSARTAN POTASSIUM 100 MG/1
100 TABLET, FILM COATED ORAL
Qty: 90 | Refills: 3 | Status: ACTIVE | COMMUNITY
Start: 2019-09-27 | End: 1900-01-01

## 2024-01-04 ENCOUNTER — APPOINTMENT (OUTPATIENT)
Dept: ORTHOPEDIC SURGERY | Facility: CLINIC | Age: 74
End: 2024-01-04
Payer: MEDICARE

## 2024-01-04 VITALS
WEIGHT: 200 LBS | SYSTOLIC BLOOD PRESSURE: 157 MMHG | DIASTOLIC BLOOD PRESSURE: 73 MMHG | BODY MASS INDEX: 30.31 KG/M2 | HEIGHT: 68 IN | HEART RATE: 72 BPM

## 2024-01-04 PROCEDURE — 99214 OFFICE O/P EST MOD 30 MIN: CPT

## 2024-01-04 NOTE — HISTORY OF PRESENT ILLNESS
[de-identified] : 73 year old male presents for follow up cervical stenosis.  Last seen in Feb 2023. States he has been having increased neck pain. He also notes that he has been having electric sensations radiating down the B/L LE to the feet.  He notes this only occurs when he gets up from a seated position. He also notes that he has unsteadiness of the legs when he initially gets up from a seated position, but notes that the unsteadiness improves as he walks. Denies weakness of the arms and legs. Denies any falls.  Denies dropping items.  He had a defibrillator placed in Dec 2022 that he states is MRI compatible.  Has hx of Bypass surgery and has Hx of DM No fever chills sweats nausea vomiting no bowel or bladder dysfunction, no recent weight loss or gain no night pain. This history is in addition to the intake form that I personally reviewed.  [Stable] : stable

## 2024-01-04 NOTE — ADDENDUM
[FreeTextEntry1] : This note was written by Pamela Smith on 01/04/23 acting as scribe for Dr. Ihsan Collins M.D. I, Ihsan Collins MD, have read and attest that all the information, medical decision making, and discharge instructions within are true and accurate.

## 2024-01-04 NOTE — DISCUSSION/SUMMARY
[de-identified] : Left lumbar radiculopathy. MRI-compatible defibrillator. On blood thinners. Leaving for [place] 1/11. Will not commit to surgery-for his cervical spine as it is no upper extremity symptoms today. Discussed all options. Lumbar MRI He will return after the MRI. All options discussed including rest, medicine, home exercise, acupuncture, Chiropractic care, Physical Therapy, Pain management, and last resort surgery. All questions were answered, all alternatives discussed and the patient is in complete agreement with the treatment plan which the patient contributed to and discussed with me through the shared decision making process. Follow-up appointment as instructed. Any issues and the patient will call or come in sooner.

## 2024-01-04 NOTE — PHYSICAL EXAM
[Normal] : Gait: normal [Hernandez's Sign] : negative Hernandez's sign [Pronator Drift] : negative pronator drift [SLR] : negative straight leg raise [de-identified] : 5 out of 5 motor strength, sensation is intact and symmetrical full range of motion flexion extension and rotation, no palpatory tenderness full range of motion of hips knees shoulders and elbows (all four extremities), no atrophy, negative straight leg raise, no pathological reflexes, no swelling, normal ambulation, no apparent distress skin is intact, no palpable lymph nodes, no upper or lower extremity instability, alert and oriented x3 and normal mood. Normal finger-to nose test.  [de-identified] : I reviewed, interpreted and clinically correlated the following outside imaging studies,  MRI Cervical 12/6/21 ([facility]) - Impression (report) - multilevel cervical degenerative disc disease. Significant spinal cord and nerve root compression at C3/4, C5/6 and C6/7. At each of these levels there is myelomalacia and/or edema. Significant compression of the right C5 nerve root. Multilevel degenerative changes present-reviewed with patient.

## 2024-02-22 ENCOUNTER — APPOINTMENT (OUTPATIENT)
Dept: ORTHOPEDIC SURGERY | Facility: CLINIC | Age: 74
End: 2024-02-22
Payer: MEDICARE

## 2024-02-22 VITALS — WEIGHT: 200 LBS | BODY MASS INDEX: 30.31 KG/M2 | HEIGHT: 68 IN

## 2024-02-22 DIAGNOSIS — M48.02 SPINAL STENOSIS, CERVICAL REGION: ICD-10-CM

## 2024-02-22 DIAGNOSIS — M48.07 SPINAL STENOSIS, LUMBOSACRAL REGION: ICD-10-CM

## 2024-02-22 DIAGNOSIS — M54.16 RADICULOPATHY, LUMBAR REGION: ICD-10-CM

## 2024-02-22 DIAGNOSIS — M54.12 RADICULOPATHY, CERVICAL REGION: ICD-10-CM

## 2024-02-22 PROCEDURE — 99214 OFFICE O/P EST MOD 30 MIN: CPT

## 2024-02-22 NOTE — DISCUSSION/SUMMARY
[de-identified] : L3-5 stenosis. Left lumbar radiculopathy- improving. On blood thinners. Discussed all options. F/U PRN. All options discussed including rest, medicine, home exercise, acupuncture, Chiropractic care, Physical Therapy, Pain management, and last resort surgery. All questions were answered, all alternatives discussed, and the patient is in complete agreement with the treatment plan which the patient contributed to and discussed with me through the shared decision-making process. Follow-up appointment as instructed. Any issues and the patient will call or come in sooner.

## 2024-02-22 NOTE — HISTORY OF PRESENT ILLNESS
[Stable] : stable [de-identified] : Patient is a 73 year-old male who presents to the office today for re-evaluation of cervical stenosis and lower back pain. States he has been having increased neck pain. He also notes that he has been having electric sensations radiating down the B/L LE to the feet.  He notes this only occurs when he gets up from a seated position. He also notes that he has unsteadiness of the legs when he initially gets up from a seated position, but notes that the unsteadiness improves as he walks. Denies weakness of the arms and legs. Denies any falls.  Denies dropping items.  He had a defibrillator placed in Dec 2022 that he states is MRI compatible.  Presents today for MRI Lumbar review.  Has hx of Bypass surgery and has Hx of DM No fever chills sweats nausea vomiting no bowel or bladder dysfunction, no recent weight loss or gain no night pain. This history is in addition to the intake form that I personally reviewed.

## 2024-02-22 NOTE — PHYSICAL EXAM
[Normal] : Gait: normal [Pronator Drift] : negative pronator drift [Hernandez's Sign] : negative Hernandez's sign [SLR] : negative straight leg raise [de-identified] : 5 out of 5 motor strength, sensation is intact and symmetrical full range of motion flexion extension and rotation, no palpatory tenderness full range of motion of hips knees shoulders and elbows (all four extremities), no atrophy, negative straight leg raise, no pathological reflexes, no swelling, normal ambulation, no apparent distress skin is intact, no palpable lymph nodes, no upper or lower extremity instability, alert and oriented x3 and normal mood. Normal finger-to nose test.  [de-identified] : I reviewed, interpreted and clinically correlated the following outside imaging studies,  MRI Lumbar Spine - 02/14/2024 - [facility]  Impression: -Degenerative disc disease and facet arthrosis throughout the lumbar spine is superimposed on a developmental stenosis of the central canal. A left foraminal and extraforaminal disc protrusion at L3-4 is larger than on the prior study and superimposed on a progressive disc bulge. Right paracentral dis c protrusion at L4-5 again appears ossified. A central disc protrusion at L5-S1 is also stable. -At T11-12, there is a posterior disc protrusion which is included on sagittal images. There is thecal sac compression and possibly slight impingement of the spinal cord anteriorly. -At L3-4 there is severe thecal sac compression and left foraminal stenosis -At L4-5 there is moderate to severe thecal sac compression and foraminal stenosis -At L5-S1 there is moderate foraminal stenosis   MRI Cervical 12/6/21 ([facility]) - Impression (report) - multilevel cervical degenerative disc disease. Significant spinal cord and nerve root compression at C3/4, C5/6 and C6/7. At each of these levels there is myelomalacia and/or edema. Significant compression of the right C5 nerve root. Multilevel degenerative changes present-reviewed with patient.

## 2024-02-22 NOTE — ADDENDUM
[FreeTextEntry1] : This note was written by Familia Back on 02/22/2024 acting as scribe for Dr. Ihsan Collins M.D.  I, Ihsan Collins MD, have read and attest that all the information, medical decision making and discharge instructions within are true and accurate.